# Patient Record
Sex: FEMALE | Race: WHITE | NOT HISPANIC OR LATINO | Employment: OTHER | URBAN - METROPOLITAN AREA
[De-identification: names, ages, dates, MRNs, and addresses within clinical notes are randomized per-mention and may not be internally consistent; named-entity substitution may affect disease eponyms.]

---

## 2017-05-30 ENCOUNTER — GENERIC CONVERSION - ENCOUNTER (OUTPATIENT)
Dept: OTHER | Facility: OTHER | Age: 67
End: 2017-05-30

## 2018-06-18 ENCOUNTER — ANNUAL EXAM (OUTPATIENT)
Dept: OBGYN CLINIC | Facility: CLINIC | Age: 68
End: 2018-06-18
Payer: COMMERCIAL

## 2018-06-18 VITALS
WEIGHT: 157 LBS | SYSTOLIC BLOOD PRESSURE: 118 MMHG | DIASTOLIC BLOOD PRESSURE: 80 MMHG | BODY MASS INDEX: 26.16 KG/M2 | HEIGHT: 65 IN

## 2018-06-18 DIAGNOSIS — Z12.31 VISIT FOR SCREENING MAMMOGRAM: Primary | ICD-10-CM

## 2018-06-18 DIAGNOSIS — Z01.419 WOMEN'S ANNUAL ROUTINE GYNECOLOGICAL EXAMINATION: ICD-10-CM

## 2018-06-18 PROBLEM — E55.9 VITAMIN D DEFICIENCY: Status: ACTIVE | Noted: 2017-04-24

## 2018-06-18 PROCEDURE — 99397 PER PM REEVAL EST PAT 65+ YR: CPT | Performed by: OBSTETRICS & GYNECOLOGY

## 2018-06-18 RX ORDER — ERGOCALCIFEROL (VITAMIN D2) 10 MCG
TABLET ORAL
COMMUNITY

## 2018-06-18 RX ORDER — LANOLIN ALCOHOL/MO/W.PET/CERES
CREAM (GRAM) TOPICAL
COMMUNITY

## 2018-06-18 RX ORDER — RIBOFLAVIN (VITAMIN B2) 100 MG
100 TABLET ORAL DAILY
COMMUNITY

## 2018-06-18 NOTE — PROGRESS NOTES
Assessment/Plan:  1  Yearly exam-Pap smear deferred, self-breast awareness reviewed, calcium/vitamin-D recommendations discussed, mammogram request given, colonoscopy as per specialist   2   Rectocele-unchanged from prior examinations  Patient was given information sheet about this  She denies any bowel movement issues  She will call with any issues  She was also given information on Kegel's exercises  3   Mild vaginal atrophy-patient without symptoms  No intervention recommended  4   History of osteoporosis-noted on most recent DEXA scan May 2017  She was seen by her primary doctor who suggested treatment but the patient declined  Patient will continue calcium with vitamin-D  Most recent vitamin-D level was 36  She has appointment with her primary physician in the near future  She will follow-up in 1 year or as needed  No problem-specific Assessment & Plan notes found for this encounter  Diagnoses and all orders for this visit:    Visit for screening mammogram  -     Mammo screening bilateral w cad; Future    Women's annual routine gynecological examination    Other orders  -     calcium citrate-vitamin D (CITRACAL+D) 315-200 MG-UNIT per tablet; Take by mouth  -     Multiple Vitamin (DAILY VALUE MULTIVITAMIN) TABS; Take by mouth  -     Evening Primrose Oil 1000 MG CAPS; by Does not apply route  -     Omega-3 Fatty Acids (FISH OIL) 645 MG CAPS; Take by mouth  -     Ascorbic Acid (VITAMIN C) 100 MG tablet; Take 100 mg by mouth daily          Subjective:      Patient ID: Willie Pimentel is a 74 y o  female  Patient was seen today for yearly exam   Please see assessment plan for details          The following portions of the patient's history were reviewed and updated as appropriate: allergies, current medications, past family history, past medical history, past social history, past surgical history and problem list     Review of Systems   Constitutional: Negative for chills, diaphoresis, fatigue and fever  Respiratory: Negative for apnea, cough, chest tightness, shortness of breath and wheezing  Cardiovascular: Negative for chest pain, palpitations and leg swelling  Gastrointestinal: Negative for abdominal distention, abdominal pain, anal bleeding, constipation, diarrhea, nausea, rectal pain and vomiting  Genitourinary: Negative for difficulty urinating, dyspareunia, dysuria, frequency, hematuria, menstrual problem, pelvic pain, urgency, vaginal bleeding, vaginal discharge and vaginal pain  Musculoskeletal: Negative for arthralgias, back pain and myalgias  Skin: Negative for color change and rash  Neurological: Negative for dizziness, syncope, light-headedness, numbness and headaches  Hematological: Negative for adenopathy  Does not bruise/bleed easily  Psychiatric/Behavioral: Negative for dysphoric mood and sleep disturbance  The patient is not nervous/anxious  Objective:      /80 (BP Location: Right arm, Patient Position: Sitting, Cuff Size: Standard)    5' 5" (1 651 m)   Wt 71 2 kg (157 lb)   BMI 26 13 kg/m²          Physical Exam    Objective      /80 (BP Location: Right arm, Patient Position: Sitting, Cuff Size: Standard)   Ht 5' 5" (1 651 m)   Wt 71 2 kg (157 lb)   BMI 26 13 kg/m²     General:   alert and oriented, in no acute distress, alert, appears stated age and cooperative   Neck: normal to inspection and palpation   Breast: normal appearance, no masses or tenderness   Heart:    Lungs:    Abdomen: soft, non-tender, without masses or organomegaly   Vulva: normal   Vagina: Slightly atrophic, without erythema or lesions or discharge  Cervix: Slightly atrophic, without lesions or cervicitis  No Cervical motion tenderness  and normal   Uterus: top normal size, anteverted, non-tender   Adnexa: no mass, fullness, tenderness   Rectum: negative, grade 1 rectocele

## 2018-06-18 NOTE — PATIENT INSTRUCTIONS
Rectocele   WHAT YOU NEED TO KNOW:   What is a rectocele? A rectocele, or vaginal hernia, is a bulge of the front wall of your rectum into your vagina  What causes a rectocele? A rectocele is often caused by weak muscles and ligaments that cannot hold and support the vagina and rectum  A wall of tough tissue, called the rectovaginal septum, separates the rectum from the vagina  The rectovaginal septum may be weak and thin  This weakening allows part of the rectum to push into the vagina  Weakening may be caused by the following:  · Aging:  Aging can cause the muscles to become weak  After menopause, a woman's body makes much less estrogen  Less estrogen makes pelvic muscles weaker  · Childbirth:  Pregnancy, and trauma during labor and delivery of a baby, may cause muscles around the vagina or rectum to weaken  Forceps used in the delivery, or tears into the rectum during delivery may cause the muscles to weaken  Large babies or multiple pregnancies may make your muscles weaker  · Genetics:  You may have been born with weaker muscles in your rectum and vagina  · Obesity:  More weight than what is suggested by your healthcare provider can put extra pressure on your muscles  · Straining:  The pressure inside your rectum increases when you strain  This usually happens with constipation, forceful coughing, and lifting heavy objects  · Surgery:  Past pelvic surgeries, such as a hysterectomy, may weaken the muscles around your vagina  What are the signs and symptoms of a rectocele? · A soft bulge of tissue in your vagina that may poke out through the vaginal opening     · Constipation    · Bowel movement that leaks out from your rectum     · Low back pain that goes away when you lie down     · Pain or pressure in your vagina when you urinate or have sex     · Pressure in your rectum, or you feel that your rectum is not empty after you have a bowel movement  How is a rectocele diagnosed?   Your healthcare provider will ask you about your lifestyle, past pregnancies, and any diseases you may have had  You may also need any of the following tests:  · Manual exam:  Your healthcare provider gently puts a warmed speculum into your vagina  A speculum is a tool that opens your vagina  Then he will ask you to strain or push down  This may cause a rectocele to bulge so he can check its size and location  You may need to tighten the muscles of your pelvis as if you are trying to stop urinating  This helps your healthcare provider learn the strength of your pelvic muscles  · Defecography:  A thick paste of barium is placed into your rectum through your anus  X-rays are taken while you push out the barium as if you are having a bowel movement  The barium makes an x-ray outline of your rectum and anus  This shows the changes taking place in your rectum and muscles during a bowel movement  · MRI:  This scan uses powerful magnets and a computer to take pictures of your abdomen and pelvis  The pictures may show problems in you rectum, vagina, or bladder  You may be given dye to help the pictures show up better  Tell the healthcare provider if you have ever had an allergic reaction to contrast dye  Do not enter the MRI room with anything metal  Metal can cause serious injury  Tell the healthcare provider if you have any metal in or on your body  · Pelvic floor fluoroscopy: This is an x-ray that shows the movement of your bowels, vagina, bladder, or rectum  Pictures of these body parts are taken and shown on a monitor  Healthcare providers may give you dye to help the pictures show up better on the screen  Tell the healthcare provider if you have ever had an allergic reaction to contrast dye  · Ultrasound: This test uses sound waves to show pictures on a monitor  An ultrasound may be done to show problems with your rectum, vagina, or bladder       · Anorectal manometry:  A flexible tube is inserted through your anus and into your rectum  Pressure from the muscles around your anus and rectum is measured by sensors in the tube  · Colonic transit studies: This test measures how fast things pass through your colon  You will be asked to swallow a large pill containing tiny plastic rings that serve as markers  These markers can be seen on x-rays  Pictures of your abdomen are taken for several days and the markers are counted  This helps your healthcare provider learn how long it takes for food to pass through your intestines  How is a rectocele treated? · Biofeedback therapy:  Biofeedback uses equipment to train you to control and relax your pelvic muscles during a bowel movement  Ask your healthcare provider for more information about biofeedback  · Estrogen:  Estrogen is a hormone that may be taken as a pill, or applied as a vaginal cream  Estrogen helps keep your pelvic muscles strong and may prevent your rectocele from getting worse  · Pessary:  A pessary is a plastic or rubber ring that is placed inside your vagina  This supports the bulging areas in your vagina and rectum  · Surgery:  Surgery may be needed to move the rectum back into place  This surgery fixes the weak walls of your vagina  Your rectum will move back into place once the walls of your vagina are fixed  What are the risks of a rectocele? A pessary may cause pain or infection  You may develop irregular bleeding or cancer if you use estrogen  Your rectocele may happen again, even with treatment  Your intestines may become blocked without treatment  How can I help prevent a rectocele? · Avoid pressure in your abdomen:  Do not strain when you have a bowel movement  Do not lift heavy objects  Tell your healthcare provider if you have a severe cough  · Do Kegel exercises regularly:  Squeeze your pelvic floor muscles to help them get stronger  Ask your healthcare provider for more information about Kegel exercises       · Drink liquids as directed:  Ask your healthcare provider how much liquid to drink each day and which liquids are best for you  Liquids will help decrease constipation  · Eat more fiber:  High-fiber foods, such as fresh fruits, vegetables, and whole grains, soften bowel movements  This helps bowel movements pass more quickly through your colon  Slowly add fiber into your diet to avoid bloating, stomach pain, and gas  · Maintain a healthy weight:  Ask your healthcare provider how much you should weigh  Ask him to help you create a weight loss plan if you are overweight  Ask for help planning an exercise program  Exercise helps your bowels work better and decreases pressure inside your colon  When should I contact my healthcare provider? · Your pain does not go away, even with treatment  · Your pessary falls out  · You have heavier vaginal bleeding than usual      · You are unable to have a bowel movement  · You have questions or concerns about your condition or care  When should I seek immediate care or call 911? · You have a mass hanging out of your vagina that you cannot push back into place  · You vomit several times in a row  · Your bowel movement is bright red or black  CARE AGREEMENT:   You have the right to help plan your care  Learn about your health condition and how it may be treated  Discuss treatment options with your caregivers to decide what care you want to receive  You always have the right to refuse treatment  The above information is an  only  It is not intended as medical advice for individual conditions or treatments  Talk to your doctor, nurse or pharmacist before following any medical regimen to see if it is safe and effective for you  © 2017 [employer] Information is for End User's use only and may not be sold, redistributed or otherwise used for commercial purposes   All illustrations and images included in CareNotes® are the copyrighted property of [employer]  or Dwayne Barragan

## 2019-09-24 ENCOUNTER — ANNUAL EXAM (OUTPATIENT)
Dept: OBGYN CLINIC | Facility: CLINIC | Age: 69
End: 2019-09-24
Payer: COMMERCIAL

## 2019-09-24 VITALS
SYSTOLIC BLOOD PRESSURE: 128 MMHG | WEIGHT: 160 LBS | DIASTOLIC BLOOD PRESSURE: 80 MMHG | HEIGHT: 66 IN | BODY MASS INDEX: 25.71 KG/M2

## 2019-09-24 DIAGNOSIS — Z01.419 WOMEN'S ANNUAL ROUTINE GYNECOLOGICAL EXAMINATION: ICD-10-CM

## 2019-09-24 DIAGNOSIS — N81.6 RECTOCELE: ICD-10-CM

## 2019-09-24 DIAGNOSIS — Z12.31 VISIT FOR SCREENING MAMMOGRAM: ICD-10-CM

## 2019-09-24 DIAGNOSIS — N95.2 ATROPHY OF VAGINA: Primary | ICD-10-CM

## 2019-09-24 PROCEDURE — G0101 CA SCREEN;PELVIC/BREAST EXAM: HCPCS | Performed by: OBSTETRICS & GYNECOLOGY

## 2019-09-24 RX ORDER — MELATONIN
1000 DAILY
COMMUNITY

## 2019-09-24 RX ORDER — NICOTINE POLACRILEX 2 MG
GUM BUCCAL
COMMUNITY

## 2019-09-24 NOTE — PATIENT INSTRUCTIONS
Vaginal Atrophy   WHAT YOU NEED TO KNOW:   What is vaginal atrophy? Vaginal atrophy is a condition that causes thinning, drying, and inflammation of vaginal tissue  This condition is caused by decreased levels of estrogen (a female sex hormone)  Vaginal atrophy can increase your risk for vaginal and urinary tract infections  Vaginal atrophy can worsen over time if not treated  What causes or increases your risk of vaginal atrophy? · Menopause     · Medicines that lower your estrogen levels, such as those used to treat breast cancer, endometriosis, or fibroids    · Radiation to your pelvic area     · Surgery to remove the ovaries    · Breastfeeding  What are the signs and symptoms of vaginal atrophy? · Vaginal dryness, itching, and burning    · Vaginal discharge    · Pain or discomfort during sex    · Light bleeding after sex    · Burning during urination    · Frequent, sudden, strong urges to urinate    · Urinary incontinence (loss of control of your bladder)  How is vaginal atrophy diagnosed? Your healthcare provider will ask about your symptoms  A pelvic exam will be done to examine your vagina and cervix  Your healthcare provider will place a speculum into your vagina to open and examine it  A sample of discharge from your vagina may be collected and tested  A urine test may also be done  How is vaginal atrophy treated? · Over-the counter vaginal moisturizers  can help reduce dryness  Your healthcare provider may recommend that you use a vaginal moisturizer several times each week and during sex  Only use creams that are made for vaginal use  Do  not  use petroleum jelly  Lubricants can be used during sex to decrease pain and discomfort  · Estrogen  may help decrease dryness  It may also lower your risk of vaginal infections if you are going through menopause  It can also help to relieve urinary symptoms  Estrogen may be prescribed in the form of a cream, tablet, or ring   These medicines can be applied or inserted into the vagina  Estrogen can also be prescribed in the form of a pill  When should I contact my healthcare provider? · You have a foul-smelling odor coming from your vagina  · You have a thick, cheese-like discharge from your vagina  · You have itching, swelling, or redness in your vagina  · You have pain or burning when you urinate  · Your urine smells bad  · Your symptoms do not improve, or they get worse  · You have questions or concerns about your condition or care  CARE AGREEMENT:   You have the right to help plan your care  Learn about your health condition and how it may be treated  Discuss treatment options with your caregivers to decide what care you want to receive  You always have the right to refuse treatment  The above information is an  only  It is not intended as medical advice for individual conditions or treatments  Talk to your doctor, nurse or pharmacist before following any medical regimen to see if it is safe and effective for you  © 2017 [employer] Information is for End User's use only and may not be sold, redistributed or otherwise used for commercial purposes  All illustrations and images included in CareNotes® are the copyrighted property of [employer]  or Dwayne Barragan

## 2019-09-24 NOTE — PROGRESS NOTES
Assessment/Plan   Diagnoses and all orders for this visit:    Atrophy of vagina    Rectocele    Women's annual routine gynecological examination    Other orders  -     cholecalciferol (VITAMIN D3) 1,000 units tablet; Take 1,000 Units by mouth daily  -     Biotin 1 MG CAPS; Take by mouth     1  Yearly exam-Pap smear deferred, self-breast awareness reviewed, calcium/vitamin-D recommendations discussed, mammogram request given, colonoscopy recommended  Hemoccult test given as patient has not had colonoscopy as of yet  She will check for insurance company coverage regarding this test   2  Rectocele-patient without symptoms  Similar findings to previous exam   Patient was previously given eROI's information on exercises  She will call or return with any issues  3  Mild vaginal atrophy-patient is asymptomatic  She was given the vaginal lubrication/moisturizer sheet she will call or return with issues  4  History of osteoporosis-DEXA scan from May 2017 demonstrated osteoporosis, with left hip -1 6 standard deviations below the mean and femoral neck -2 6 standard deviations  Patient was previously counseled about treatment options  She will continue to follow-up with her primary doctor regarding this issue  Most recent vitamin-D level from 2018 was 61    5  Other-patient's   May 2019  She was  54 years  My support was given  She seems to be doing quite well with good family support  She will follow-up in 1 year or as needed  Subjective   Patient ID: Anya Huang is a 79 y o  female  Vitals:    [phone]   BP: 128/80     Patient was seen today for yearly exam   Please see assessment plan for details        The following portions of the patient's history were reviewed and updated as appropriate: allergies, current medications, past family history, past medical history, past social history, past surgical history and problem list   Past Medical History:   Diagnosis Date  Urinary tract infection     Uterine leiomyoma     last assessed 2014     Past Surgical History:   Procedure Laterality Date    DILATION AND CURETTAGE OF UTERUS      ENDOMETRIAL BIOPSY      by suction    HYSTEROSCOPY      TUBAL LIGATION       OB History    Para Term  AB Living   1 1           SAB TAB Ectopic Multiple Live Births                  # Outcome Date GA Lbr [place]/2nd Weight Sex Delivery Anes PTL Lv   1 Para                Current Outpatient Medications:     Ascorbic Acid (VITAMIN C) 100 MG tablet, Take 100 mg by mouth daily, Disp: , Rfl:     Biotin 1 MG CAPS, Take by mouth, Disp: , Rfl:     calcium citrate-vitamin D (CITRACAL+D) 315-200 MG-UNIT per tablet, Take by mouth, Disp: , Rfl:     cholecalciferol (VITAMIN D3) 1,000 units tablet, Take 1,000 Units by mouth daily, Disp: , Rfl:     Evening Primrose Oil 1000 MG CAPS, by Does not apply route, Disp: , Rfl:     Multiple Vitamin (DAILY VALUE MULTIVITAMIN) TABS, Take by mouth, Disp: , Rfl:     Omega-3 Fatty Acids (FISH OIL) 645 MG CAPS, Take by mouth, Disp: , Rfl:   No Known Allergies  Social History     Socioeconomic History    Marital status: /Civil Union     Spouse name: None    Number of children: 1    Years of education: None    Highest education level: None   Occupational History    None   Social Needs    Financial resource strain: None    Food insecurity:     Worry: None     Inability: None    Transportation needs:     Medical: None     Non-medical: None   Tobacco Use    Smoking status: Never Smoker    Smokeless tobacco: Never Used   Substance and Sexual Activity    Alcohol use: No    Drug use: No    Sexual activity: Never   Lifestyle    Physical activity:     Days per week: None     Minutes per session: None    Stress: None   Relationships    Social connections:     Talks on phone: None     Gets together: None     Attends Hoahaoism service: None     Active member of club or organization: None Attends meetings of clubs or organizations: None     Relationship status: None    Intimate partner violence:     Fear of current or ex partner: None     Emotionally abused: None     Physically abused: None     Forced sexual activity: None   Other Topics Concern    None   Social History Narrative    Caffeine use    Confucianism affiliation-None    Uses safety equipment-seat belts     Family History   Problem Relation Age of Onset    Stomach cancer Mother     Hypertension Mother     Heart attack Father         acute MI    Heart disease Father     Diabetes Brother        Review of Systems   Constitutional: Negative for chills, diaphoresis, fatigue and fever  Respiratory: Negative for apnea, cough, chest tightness, shortness of breath and wheezing  Cardiovascular: Negative for chest pain, palpitations and leg swelling  Gastrointestinal: Negative for abdominal distention, abdominal pain, anal bleeding, constipation, diarrhea, nausea, rectal pain and vomiting  Genitourinary: Negative for difficulty urinating, dyspareunia, dysuria, frequency, hematuria, menstrual problem, pelvic pain, urgency, vaginal bleeding, vaginal discharge and vaginal pain  Musculoskeletal: Negative for arthralgias, back pain and myalgias  Skin: Negative for color change and rash  Neurological: Negative for dizziness, syncope, light-headedness, numbness and headaches  Hematological: Negative for adenopathy  Does not bruise/bleed easily  Psychiatric/Behavioral: Negative for dysphoric mood and sleep disturbance  The patient is not nervous/anxious          Objective   Physical Exam    Objective      /80 (BP Location: Left arm, Patient Position: Sitting, Cuff Size: Large)   Ht 5' 5 5" (1 664 m)   Wt 72 6 kg (160 lb)   BMI 26 22 kg/m²     General:   alert and oriented, in no acute distress   Neck: normal to inspection and palpation   Breast: normal appearance, no masses or tenderness   Heart:    Lungs:    Abdomen: soft, non-tender, without masses or organomegaly   Vulva: normal   Vagina: Mildly atrophic, without erythema or lesions or discharge  Cervix: Mildly atrophic, without lesions or discharge or cervicitis  No Cervical motion tenderness     Uterus: top normal size, anteverted, non-tender   Adnexa: no mass, fullness, tenderness   Rectum: Negative, grade 1-2 rectocele    Psych:  Normal mood and affect   Skin:  Without obvious lesions   Eyes: symmetric, with normal movements and reactivity   Musculoskeletal:  Normal muscle tone and movements appreciated

## 2021-09-13 ENCOUNTER — ANNUAL EXAM (OUTPATIENT)
Dept: OBGYN CLINIC | Facility: CLINIC | Age: 71
End: 2021-09-13
Payer: COMMERCIAL

## 2021-09-13 VITALS
SYSTOLIC BLOOD PRESSURE: 116 MMHG | HEIGHT: 63 IN | WEIGHT: 160 LBS | DIASTOLIC BLOOD PRESSURE: 78 MMHG | BODY MASS INDEX: 28.35 KG/M2

## 2021-09-13 DIAGNOSIS — N81.6 RECTOCELE: ICD-10-CM

## 2021-09-13 DIAGNOSIS — Z01.419 WOMEN'S ANNUAL ROUTINE GYNECOLOGICAL EXAMINATION: Primary | ICD-10-CM

## 2021-09-13 DIAGNOSIS — N95.2 ATROPHY OF VAGINA: ICD-10-CM

## 2021-09-13 DIAGNOSIS — Z12.4 CERVICAL CANCER SCREENING: ICD-10-CM

## 2021-09-13 DIAGNOSIS — Z12.31 VISIT FOR SCREENING MAMMOGRAM: ICD-10-CM

## 2021-09-13 PROCEDURE — G0145 SCR C/V CYTO,THINLAYER,RESCR: HCPCS | Performed by: OBSTETRICS & GYNECOLOGY

## 2021-09-13 PROCEDURE — G0101 CA SCREEN;PELVIC/BREAST EXAM: HCPCS | Performed by: OBSTETRICS & GYNECOLOGY

## 2021-09-13 RX ORDER — LYSINE 500 MG
500 TABLET ORAL DAILY
COMMUNITY

## 2021-09-13 RX ORDER — SELENIUM 50 MCG
1 TABLET ORAL DAILY
COMMUNITY

## 2021-09-13 NOTE — PATIENT INSTRUCTIONS
Vaginal Atrophy   WHAT YOU NEED TO KNOW:   What is vaginal atrophy? Vaginal atrophy is a condition that causes thinning, drying, and inflammation of vaginal tissue  This condition is caused by decreased levels of estrogen (a female sex hormone)  Vaginal atrophy can increase your risk for vaginal and urinary tract infections  Vaginal atrophy can worsen over time if not treated  What causes or increases your risk of vaginal atrophy? · Menopause     · Medicines that lower your estrogen levels, such as those used to treat breast cancer, endometriosis, or fibroids    · Radiation to your pelvic area     · Surgery to remove the ovaries    · Breastfeeding    What are the signs and symptoms of vaginal atrophy? · Vaginal dryness, itching, and burning    · Vaginal discharge    · Pain or discomfort during sex    · Light bleeding after sex    · Burning during urination    · Frequent, sudden, strong urges to urinate    · Urinary incontinence (loss of control of your bladder)    How is vaginal atrophy diagnosed? Your healthcare provider will ask about your symptoms  A pelvic exam will be done to examine your vagina and cervix  Your healthcare provider will place a speculum into your vagina to open and examine it  A sample of discharge from your vagina may be collected and tested  A urine test may also be done  How is vaginal atrophy treated? · Over-the counter vaginal moisturizers  can help reduce dryness  Your healthcare provider may recommend that you use a vaginal moisturizer several times each week and during sex  Only use creams that are made for vaginal use  Do  not  use petroleum jelly  Lubricants can be used during sex to decrease pain and discomfort  · Estrogen  may help decrease dryness  It may also lower your risk of vaginal infections if you are going through menopause  It can also help to relieve urinary symptoms  Estrogen may be prescribed in the form of a cream, tablet, or ring   These medicines can be applied or inserted into the vagina  Estrogen can also be prescribed in the form of a pill  When should I contact my healthcare provider? · You have a foul-smelling odor coming from your vagina  · You have a thick, cheese-like discharge from your vagina  · You have itching, swelling, or redness in your vagina  · You have pain or burning when you urinate  · Your urine smells bad  · Your symptoms do not improve, or they get worse  · You have questions or concerns about your condition or care  CARE AGREEMENT:   You have the right to help plan your care  Learn about your health condition and how it may be treated  Discuss treatment options with your healthcare providers to decide what care you want to receive  You always have the right to refuse treatment  The above information is an  only  It is not intended as medical advice for individual conditions or treatments  Talk to your doctor, nurse or pharmacist before following any medical regimen to see if it is safe and effective for you  © Copyright [employer] 2021 Information is for End User's use only and may not be sold, redistributed or otherwise used for commercial purposes  All illustrations and images included in CareNotes® are the copyrighted property of A D A M , Inc  or [address] [place] [address]  Osteoporosis   WHAT YOU NEED TO KNOW:   What is osteoporosis? Osteoporosis is a long-term medical condition that causes your bones to become weak, brittle, and more likely to fracture  Osteoporosis occurs when your body absorbs more bone than it makes  It is also caused by a lack of calcium and estrogen (female hormone)  What increases my risk for osteoporosis?    · Age older than 28    · Low estrogen levels    · Female gender    · Alcohol, tobacco, or caffeine    · Lack of calcium and vitamin D in your foods    · Lack of exercise    · Some illnesses, such as thyroid diseases, bone cancer, and long-term lung diseases    · Certain medicines, such as steroids, anticonvulsants, and blood-thinners    What are the signs and symptoms of osteoporosis? You may not have any signs or symptoms  You may break a bone after a muscle strain, bump, or fall  A break usually occurs in the hip, spine, or wrist  A collapsed vertebra (bone in your spine) may cause severe back pain or loss of height from bent posture  How is osteoporosis diagnosed? · Blood and urine tests  measure your calcium, vitamin D, and estrogen levels  · An x-ray or CT  may show thinned bones or a fracture  You may be given contrast liquid to help the bones show up better in the pictures  Tell the healthcare provider if you have ever had an allergic reaction to contrast liquid  Do not enter the MRI room with anything metal  Metal can cause serious injury  Tell the healthcare provider if you have any metal in or on your body  · A bone density test  compares your bone thickness with what is expected for someone of your age, gender, and ethnicity  How is osteoporosis treated? Medicines may be given to prevent bone loss, build new bone, and increase estrogen  These medicines help prevent fractures and may be given as a pill or injection  Ask your healthcare provider for more information on these medicines  How can I help prevent bone loss? · Eat healthy foods that are high in calcium  This helps keep your bones strong  Good sources of calcium are milk, cheese, broccoli, tofu, almonds, and canned salmon and sardines  · Increase your vitamin D intake  Vitamin D is in fish oils, some vegetables, and fortified milk, cereal, and bread  Vitamin D is also formed in the skin when it is exposed to the sun  Ask your healthcare provider how much sunlight is safe for you  · Drink liquids as directed  Ask your healthcare provider how much liquid to drink each day and which liquids are best for you  Do not have alcohol or caffeine   They decrease bone mineral density, which can weaken your bones  · Exercise regularly  Ask your healthcare provider about the best exercise plan for you  Weight bearing exercise for 30 minutes, 3 times a week can help build and strengthen bone  · Do not smoke  Nicotine and other chemicals in cigarettes and cigars can cause lung damage  Ask your healthcare provider for information if you currently smoke and need help to quit  E-cigarettes or smokeless tobacco still contain nicotine  Talk to your healthcare provider before you use these products  · Go to physical therapy as directed  A physical therapist teaches you exercises to help improve movement and muscle strength  When should I call my doctor? · You have severe pain  · You have increasing pain after a fall  · You have pain when you do your daily activities  · You have questions or concerns about your condition or care  CARE AGREEMENT:   You have the right to help plan your care  Learn about your health condition and how it may be treated  Discuss treatment options with your healthcare providers to decide what care you want to receive  You always have the right to refuse treatment  The above information is an  only  It is not intended as medical advice for individual conditions or treatments  Talk to your doctor, nurse or pharmacist before following any medical regimen to see if it is safe and effective for you  © Copyright [employer] 2021 Information is for End User's use only and may not be sold, redistributed or otherwise used for commercial purposes   All illustrations and images included in CareNotes® are the copyrighted property of A D A M , Inc  or [address] [place] [address] [facility]

## 2021-09-13 NOTE — PROGRESS NOTES
Assessment/Plan   Diagnoses and all orders for this visit:    Women's annual routine gynecological examination    Visit for screening mammogram  -     Mammo screening bilateral w 3d & cad; Future    Rectocele    Atrophy of vagina    Other orders  -     Calcium Carbonate-Vitamin D (Oyster Shell Calcium/D) 250-125 MG-UNIT TABS; Take 1 tablet by mouth daily (Patient not taking: Reported on 2021)  -     L-Lysine 500 MG TABS; Take 500 mg by mouth daily  -     Multiple Vitamins-Minerals (ZINC PO); Take by mouth    1  Yearly exam-Pap smear done with reflex HPV, self-breast awareness reviewed, calcium/vitamin-D recommendations discussed, mammogram request given, colonoscopy as per specialist   2  Grade 1 rectocele-minimal changes noted on exam   No change noted from previous visit  Patient denies any bowel movement concerns  Counseled about Kegel's exercises, sheets given  Recommend avoidance of heavy lifting or straining with bowel movements  To call or return with any issues  3  Mild-to-moderate vaginal atrophy-patient is without symptoms  She was given vaginal lubrication/moisturizer sheet she will use accordingly  4  History osteoporosis-DEXA scan most recently from 19 with lumbar spine T-score-2 7, decreased 1 8% from 2017 study  Femoral neck T-score-2 2, increased 9 2% from previous study  Patient declines medication at this time  She did have virtual visit with endocrinology and had blood work done  A protein score was elevated which gave her much anxiety  She saw Hematology, who stated that she may have a very small risk of multiple myeloma in the 30 year future  At this time, continues calcium, vitamin-D, and weight-bearing exercise  To discuss DEXA scan with her primary doctor  5  Other-  May 2019  Support was previously given  Follow-up 1-2 years for yearly exam as needed  Subjective   Patient ID: Lily Powers is a 78 y o  female      Vitals:    21 1110   BP: 116/78     Patient was seen today for yearly exam   Please see assessment plan for details        The following portions of the patient's history were reviewed and updated as appropriate: allergies, current medications, past family history, past medical history, past social history, past surgical history and problem list   Past Medical History:   Diagnosis Date    Urinary tract infection     Uterine leiomyoma     last assessed 2014     Past Surgical History:   Procedure Laterality Date    DILATION AND CURETTAGE OF UTERUS      ENDOMETRIAL BIOPSY      by suction    HYSTEROSCOPY      TUBAL LIGATION       OB History    Para Term  AB Living   1 1           SAB TAB Ectopic Multiple Live Births                  # Outcome Date GA Lbr [place]/2nd Weight Sex Delivery Anes PTL Lv   1 Para                Current Outpatient Medications:     Ascorbic Acid (VITAMIN C) 100 MG tablet, Take 100 mg by mouth daily, Disp: , Rfl:     Biotin 1 MG CAPS, Take by mouth, Disp: , Rfl:     calcium citrate-vitamin D (CITRACAL+D) 315-200 MG-UNIT per tablet, Take by mouth, Disp: , Rfl:     cholecalciferol (VITAMIN D3) 1,000 units tablet, Take 1,000 Units by mouth daily, Disp: , Rfl:     Evening Primrose Oil 1000 MG CAPS, by Does not apply route, Disp: , Rfl:     L-Lysine 500 MG TABS, Take 500 mg by mouth daily, Disp: , Rfl:     Multiple Vitamin (DAILY VALUE MULTIVITAMIN) TABS, Take by mouth, Disp: , Rfl:     Multiple Vitamins-Minerals (ZINC PO), Take by mouth, Disp: , Rfl:     Omega-3 Fatty Acids (FISH OIL) 645 MG CAPS, Take by mouth, Disp: , Rfl:     Calcium Carbonate-Vitamin D (Oyster Shell Calcium/D) 250-125 MG-UNIT TABS, Take 1 tablet by mouth daily (Patient not taking: Reported on 2021), Disp: , Rfl:   No Known Allergies  Social History     Socioeconomic History    Marital status: /Civil Union     Spouse name: None    Number of children: 1    Years of education: None    Highest education level: None   Occupational History    None   Tobacco Use    Smoking status: Never Smoker    Smokeless tobacco: Never Used   Vaping Use    Vaping Use: Never used   Substance and Sexual Activity    Alcohol use: No    Drug use: No    Sexual activity: Not Currently   Other Topics Concern    None   Social History Narrative    Caffeine use    Mandaeism affiliation-None    Uses safety equipment-seat belts     Social Determinants of Health     Financial Resource Strain:     Difficulty of Paying Living Expenses:    Food Insecurity:     Worried About Running Out of Food in the Last Year:     Ran Out of Food in the Last Year:    Transportation Needs:     Lack of Transportation (Medical):  Lack of Transportation (Non-Medical):    Physical Activity:     Days of Exercise per Week:     Minutes of Exercise per Session:    Stress:     Feeling of Stress :    Social Connections:     Frequency of Communication with Friends and Family:     Frequency of Social Gatherings with Friends and Family:     Attends Mandaeism Services:     Active Member of Clubs or Organizations:     Attends Club or Organization Meetings:     Marital Status:    Intimate Partner Violence:     Fear of Current or Ex-Partner:     Emotionally Abused:     Physically Abused:     Sexually Abused:      Family History   Problem Relation Age of Onset    Stomach cancer Mother     Hypertension Mother     Heart attack Father         acute MI    Heart disease Father     Diabetes Brother        Review of Systems   Constitutional: Negative for chills, diaphoresis, fatigue and fever  Respiratory: Negative for apnea, cough, chest tightness, shortness of breath and wheezing  Cardiovascular: Negative for chest pain, palpitations and leg swelling  Gastrointestinal: Negative for abdominal distention, abdominal pain, anal bleeding, constipation, diarrhea, nausea, rectal pain and vomiting     Genitourinary: Negative for difficulty urinating, dyspareunia, dysuria, frequency, hematuria, menstrual problem, pelvic pain, urgency, vaginal bleeding, vaginal discharge and vaginal pain  Musculoskeletal: Negative for arthralgias, back pain and myalgias  Skin: Negative for color change and rash  Neurological: Negative for dizziness, syncope, light-headedness, numbness and headaches  Hematological: Negative for adenopathy  Does not bruise/bleed easily  Psychiatric/Behavioral: Negative for dysphoric mood and sleep disturbance  The patient is not nervous/anxious  Objective   Physical Exam  OBGyn Exam     Objective      /78 (BP Location: Left arm, Patient Position: Sitting, Cuff Size: Adult)   Ht 5' 3" (1 6 m)   Wt 72 6 kg (160 lb)   BMI 28 34 kg/m²     General:   alert and oriented, in no acute distress   Neck: normal to inspection and palpation   Breast: normal appearance, no masses or tenderness   Heart:    Lungs:    Abdomen: soft, non-tender, without masses or organomegaly   Vulva: normal   Vagina: Mild to moderate atrophy, without erythema or lesions or discharge  Cervix: Mild to moderate atrophy, without lesions or discharge or cervicitis    No Cervical motion tenderness   Uterus: top normal size, anteverted, non-tender   Adnexa: no mass, fullness, tenderness   Rectum: negative, rectocele grade 1    Psych:  Normal mood and affect   Skin:  Without obvious lesions   Eyes: symmetric, with normal movements and reactivity   Musculoskeletal:  Normal muscle tone and movements appreciated

## 2021-09-17 LAB
LAB AP GYN PRIMARY INTERPRETATION: NORMAL
Lab: NORMAL

## 2022-05-09 ENCOUNTER — APPOINTMENT (OUTPATIENT)
Dept: RADIOLOGY | Facility: CLINIC | Age: 72
End: 2022-05-09
Payer: COMMERCIAL

## 2022-05-09 ENCOUNTER — OFFICE VISIT (OUTPATIENT)
Dept: URGENT CARE | Facility: CLINIC | Age: 72
End: 2022-05-09
Payer: COMMERCIAL

## 2022-05-09 VITALS — HEART RATE: 83 BPM | TEMPERATURE: 97.2 F | SYSTOLIC BLOOD PRESSURE: 112 MMHG | DIASTOLIC BLOOD PRESSURE: 62 MMHG

## 2022-05-09 DIAGNOSIS — S82.839A DISPLACED FRACTURE OF DISTAL END OF FIBULA: ICD-10-CM

## 2022-05-09 DIAGNOSIS — S99.911A INJURY OF RIGHT ANKLE, INITIAL ENCOUNTER: ICD-10-CM

## 2022-05-09 DIAGNOSIS — S99.911A INJURY OF RIGHT ANKLE, INITIAL ENCOUNTER: Primary | ICD-10-CM

## 2022-05-09 PROCEDURE — S9083 URGENT CARE CENTER GLOBAL: HCPCS | Performed by: NURSE PRACTITIONER

## 2022-05-09 PROCEDURE — 73610 X-RAY EXAM OF ANKLE: CPT

## 2022-05-09 PROCEDURE — 99213 OFFICE O/P EST LOW 20 MIN: CPT | Performed by: NURSE PRACTITIONER

## 2022-05-09 NOTE — PATIENT INSTRUCTIONS
Ankle Fracture   AMBULATORY CARE:   An ankle fracture  is a break in 1 or more of the bones in your ankle  Common signs and symptoms include the following:   · Sudden, severe pain    · Bruising on your ankle    · Tenderness, redness, and swelling in your ankle    · Trouble moving or putting weight on your ankle or foot    · Weakness or numbness in your ankle    · Deformed ankle shape    Call your local emergency number (911 in the [address]) for any of the following:   · You feel lightheaded, short of breath, and have chest pain  · You cough up blood  Seek care immediately if:   · Your leg feels warm, tender, and painful  It may look swollen and red  · Blood soaks through your bandage  · You have severe pain in your ankle  · Your cast feels too tight  · Your foot or toes are cold or numb  · Your foot or toenails turn blue or gray  Call your doctor if:   · Your splint feels too tight  · Your swelling has increased or returned  · You have a fever  · Your pain does not go away, even after treatment  · You have questions or concerns about your condition or care  Treatment for an ankle fracture:   · Acetaminophen  decreases pain and fever  It is available without a doctor's order  Ask how much to take and how often to take it  Follow directions  Read the labels of all other medicines you are using to see if they also contain acetaminophen, or ask your doctor or pharmacist  Acetaminophen can cause liver damage if not taken correctly  Do not use more than 4 grams (4,000 milligrams) total of acetaminophen in one day  · NSAIDs , such as ibuprofen, help decrease swelling, pain, and fever  This medicine is available with or without a doctor's order  NSAIDs can cause stomach bleeding or kidney problems in certain people  If you take blood thinner medicine, always ask your healthcare provider if NSAIDs are safe for you   Always read the medicine label and follow directions  · Prescription pain medicine  may be given  Ask your healthcare provider how to take this medicine safely  Some prescription pain medicines contain acetaminophen  Do not take other medicines that contain acetaminophen without talking to your healthcare provider  Too much acetaminophen may cause liver damage  Prescription pain medicine may cause constipation  Ask your healthcare provider how to prevent or treat constipation  · Closed reduction  may be done to put your bones back into their correct position without surgery  · Open reduction surgery  is done when a closed reduction does not work or you have ligament damage  An incision is made and the bones and ligaments are put back in the correct position  This may include the use of special wires, pins, plates or screws  Self-care:       · Rest  your ankle so that it can heal  Return to normal activities as directed  · Apply ice  on your ankle for 15 to 20 minutes every hour or as directed  Use an ice pack, or put crushed ice in a plastic bag  Cover it with a towel  Ice helps prevent tissue damage and decreases swelling and pain  · Use a support device,  such as a brace, cast, or splint to limit your movement and protect your ankle  You may need to use crutches to protect your ankle and decrease your pain as you move around  Do not remove your device and do not put weight on your injured ankle  · Elevate  your ankle above the level of your heart as often as you can  This will help decrease swelling and pain  Prop your ankle on pillows or blankets to keep it elevated comfortably  Splint and cast care:  Cover the splint or cast before you bathe so it does not get wet  Tape 2 plastic trash bags to your skin above the cast  Try to keep your ankle out of the water as much as possible  Follow up with your doctor in 1 to 2 days, or as directed:   Your fracture may need to be reduced (bones pushed back into place) or you may need surgery  Write down your questions so you remember to ask them during your visits  © Copyright [employer] 2022 Information is for End User's use only and may not be sold, redistributed or otherwise used for commercial purposes  All illustrations and images included in CareNotes® are the copyrighted property of [employer]  or Yulia Prado  The above information is an  only  It is not intended as medical advice for individual conditions or treatments  Talk to your doctor, nurse or pharmacist before following any medical regimen to see if it is safe and effective for you

## 2022-05-09 NOTE — PROGRESS NOTES
[facility]        NAME: Emelia Fowler is a 73 y o  female  : 1950    MRN: [id number]  DATE: May 9, 2022  TIME: 5:53 PM    Assessment and Plan   Injury of right ankle, initial encounter [S99 911A]  1  Injury of right ankle, initial encounter  XR ankle 3+ vw right    Ambulatory Referral to Orthopedic Surgery   2  Displaced fracture of distal end of fibula  Ambulatory Referral to Orthopedic Surgery     Xray done in office   Avulsion fracture of distal fibula -   Walking boot applied  Referral to ortho   F/u as directed  Pt in agreement with plan    Patient Instructions     Follow up with PCP in 3-5 days  Proceed to  ER if symptoms worsen  Chief Complaint     Chief Complaint   Patient presents with    Ankle Injury     Patient states that she overturned her right ankle saturday a week ago  History of Present Illness   Emelia Fowler presents to the clinic c/o    Pt states was walking and overturned ankle while wearing sketchers  Pain immediately   Has been icing and applying rubs -   Has been helping   Pain worse with weight bearing  Review of Systems   Review of Systems   All other systems reviewed and are negative          Current Medications     Long-Term Medications   Medication Sig Dispense Refill    Ascorbic Acid (VITAMIN C) 100 MG tablet Take 100 mg by mouth daily      Biotin 1 MG CAPS Take by mouth      calcium citrate-vitamin D (CITRACAL+D) 315-200 MG-UNIT per tablet Take by mouth      cholecalciferol (VITAMIN D3) 1,000 units tablet Take 1,000 Units by mouth daily      Evening Primrose Oil 1000 MG CAPS by Does not apply route      L-Lysine 500 MG TABS Take 500 mg by mouth daily      Multiple Vitamin (DAILY VALUE MULTIVITAMIN) TABS Take by mouth      Omega-3 Fatty Acids (FISH OIL) 645 MG CAPS Take by mouth      Calcium Carbonate-Vitamin D (Oyster Shell Calcium/D) 250-125 MG-UNIT TABS Take 1 tablet by mouth daily (Patient not taking: Reported on 2021) Current Allergies     Allergies as of 05/09/2022    (No Known Allergies)            The following portions of the patient's history were reviewed and updated as appropriate: allergies, current medications, past family history, past medical history, past social history, past surgical history and problem list     Objective   /62   Pulse 83   Temp (!) 97 2 °F (36 2 °C) (Tympanic)          Physical Exam     Physical Exam  Vitals and nursing note reviewed  Constitutional:       Appearance: Normal appearance  She is well-developed  HENT:      Head: Normocephalic and atraumatic  Eyes:      General: Lids are normal       Conjunctiva/sclera: Conjunctivae normal    Cardiovascular:      Rate and Rhythm: Normal rate and regular rhythm  Heart sounds: Normal heart sounds, S1 normal and S2 normal    Pulmonary:      Effort: Pulmonary effort is normal       Breath sounds: Normal breath sounds  Musculoskeletal:      Right ankle: Swelling present  No deformity, ecchymosis or lacerations  Tenderness present over the lateral malleolus  No medial malleolus, ATF ligament, AITF ligament, CF ligament, posterior TF ligament, base of 5th metatarsal or proximal fibula tenderness  Decreased range of motion  Anterior drawer test negative  Normal pulse  Right Achilles Tendon: Normal    Skin:     General: Skin is warm and dry  Neurological:      Mental Status: She is alert and oriented to person, place, and time  Psychiatric:         Speech: Speech normal          Behavior: Behavior normal  Behavior is cooperative  Thought Content:  Thought content normal          Judgment: Judgment normal              Orthopedic injury treatment    Date/Time: 5/9/2022 5:56 PM  Performed by: TOMEKA Paiz  Authorized by: TOMEKA Paiz     Patient Location:  Clinic  Other Assisting Provider: No    Verbal consent obtained?: Yes    Consent given by:  Patient  Patient states understanding of procedure being performed: Yes    Patient's understanding of procedure matches consent: Yes    Procedure consent matches procedure scheduled: Yes    Relevant documents present and verified: Yes    Test results available and properly labeled: Yes    Site marked: Yes    Radiology Images displayed and confirmed  If images not available, report reviewed: Yes    Required items: Required blood products, implants, devices and special equipment available    Patient identity confirmed:  Verbally with patient  Time out: Immediately prior to the procedure a time out was called    Injury location:  Ankle  Location details:  Right ankle  Injury type:  Fracture  Fracture type: lateral malleolus    Fracture type: lateral malleolus    Neurovascular status: Neurovascularly intact    Distal perfusion: normal    Neurological function: normal    Range of motion: normal    Local anesthesia used?: No    Manipulation performed?: No    Splint type: short leg walking boot    Neurovascular status: Neurovascularly intact    Distal perfusion: normal    Neurological function: normal    Range of motion: normal    Patient tolerance:  Patient tolerated the procedure well with no immediate complications

## 2023-07-13 DIAGNOSIS — Z12.31 VISIT FOR SCREENING MAMMOGRAM: ICD-10-CM

## 2023-09-15 ENCOUNTER — ANNUAL EXAM (OUTPATIENT)
Dept: GYNECOLOGY | Facility: CLINIC | Age: 73
End: 2023-09-15
Payer: COMMERCIAL

## 2023-09-15 VITALS
BODY MASS INDEX: 28.88 KG/M2 | DIASTOLIC BLOOD PRESSURE: 78 MMHG | SYSTOLIC BLOOD PRESSURE: 124 MMHG | HEIGHT: 63 IN | WEIGHT: 163 LBS

## 2023-09-15 DIAGNOSIS — Z01.419 WOMEN'S ANNUAL ROUTINE GYNECOLOGICAL EXAMINATION: Primary | ICD-10-CM

## 2023-09-15 DIAGNOSIS — Z12.31 ENCOUNTER FOR SCREENING MAMMOGRAM FOR BREAST CANCER: ICD-10-CM

## 2023-09-15 DIAGNOSIS — M81.0 AGE-RELATED OSTEOPOROSIS WITHOUT CURRENT PATHOLOGICAL FRACTURE: ICD-10-CM

## 2023-09-15 DIAGNOSIS — N81.6 RECTOCELE: ICD-10-CM

## 2023-09-15 DIAGNOSIS — N95.2 ATROPHY OF VAGINA: ICD-10-CM

## 2023-09-15 PROCEDURE — G0101 CA SCREEN;PELVIC/BREAST EXAM: HCPCS | Performed by: OBSTETRICS & GYNECOLOGY

## 2023-09-15 PROCEDURE — G0145 SCR C/V CYTO,THINLAYER,RESCR: HCPCS | Performed by: OBSTETRICS & GYNECOLOGY

## 2023-09-15 NOTE — PROGRESS NOTES
Assessment/Plan   Diagnoses and all orders for this visit:    Women's annual routine gynecological examination    Encounter for screening mammogram for breast cancer  -     Mammo screening bilateral w 3d & cad; Future    Atrophy of vagina    Rectocele    Age-related osteoporosis without current pathological fracture    1. yearly exam-Pap smear done with reflex HPV at patient request, self breast awareness reviewed, calcium/vitamin D recommendations discussed, mammogram request given, colonoscopy recommended. Had Cologuard 2023, follow-up 3 years time. 2.  Grade 1 rectocele- stable findings noted on exam.  Patient denies any complaints. Reviewed possible symptoms related to rectocele, to call with any bowel movement issues. 3.  Mild to moderate atrophy- denies any symptoms. Vaginal lubrication/moisturizer sheet given, to use accordingly. 4.  History of osteoporosis- most recent DEXA scan 2022, lumbar spine -2.5 increased to 2% with femoral neck T score -2.7, decreased 9% from previous study . Patient is taking boron and strontium along with calcium and vitamin D. Again reviewed treatment options, she declines at this time. She plans to get repeat DEXA scan in  and then proceed accordingly. 5. other-  May 2019. Middle granddaughter is pregnant with her second great grandchild. My congratulations given. Follow-up 2 years for yearly exam or as needed. Subjective   Patient ID: Funmilayo Martinez is a 81 y.o. female. Vitals:    09/15/23 0858   BP: 124/78     Patient was seen today for yearly exam.  Please see assessment plan for details.       The following portions of the patient's history were reviewed and updated as appropriate: allergies, current medications, past family history, past medical history, past social history, past surgical history and problem list.  Past Medical History:   Diagnosis Date   • Urinary tract infection    • Uterine leiomyoma     last assessed 2014     Past Surgical History:   Procedure Laterality Date   • DILATION AND CURETTAGE OF UTERUS     • ENDOMETRIAL BIOPSY      by suction   • HYSTEROSCOPY     • TUBAL LIGATION       OB History    Para Term  AB Living   1 1           SAB IAB Ectopic Multiple Live Births                  # Outcome Date GA Lbr [place]/2nd Weight Sex Delivery Anes PTL Lv   1 Para                Current Outpatient Medications:   •  Ascorbic Acid (VITAMIN C) 100 MG tablet, Take 100 mg by mouth daily, Disp: , Rfl:   •  Biotin 1 MG CAPS, Take by mouth, Disp: , Rfl:   •  calcium citrate-vitamin D (CITRACAL+D) 315-200 MG-UNIT per tablet, Take by mouth, Disp: , Rfl:   •  cholecalciferol (VITAMIN D3) 1,000 units tablet, Take 1,000 Units by mouth daily, Disp: , Rfl:   •  Evening Primrose Oil 1000 MG CAPS, by Does not apply route, Disp: , Rfl:   •  Multiple Vitamin (DAILY VALUE MULTIVITAMIN) TABS, Take by mouth, Disp: , Rfl:   •  Omega-3 Fatty Acids (FISH OIL) 645 MG CAPS, Take by mouth, Disp: , Rfl:   •  Calcium Carbonate-Vitamin D (Oyster Shell Calcium/D) 250-125 MG-UNIT TABS, Take 1 tablet by mouth daily (Patient not taking: Reported on 2021), Disp: , Rfl:   •  L-Lysine 500 MG TABS, Take 500 mg by mouth daily (Patient not taking: Reported on 9/15/2023), Disp: , Rfl:   •  Multiple Vitamins-Minerals (ZINC PO), Take by mouth (Patient not taking: Reported on 9/15/2023), Disp: , Rfl:   No Known Allergies  Social History     Socioeconomic History   • Marital status: /Civil Union     Spouse name: None   • Number of children: 1   • Years of education: None   • Highest education level: None   Occupational History   • None   Tobacco Use   • Smoking status: Never   • Smokeless tobacco: Never   Vaping Use   • Vaping Use: Never used   Substance and Sexual Activity   • Alcohol use: No   • Drug use: No   • Sexual activity: Not Currently   Other Topics Concern   • None   Social History Narrative    Caffeine use    Muslim affiliation-None    Uses safety equipment-seat belts     Social Determinants of Health     Financial Resource Strain: Not on file   Food Insecurity: Not on file   Transportation Needs: Not on file   Physical Activity: Not on file   Stress: Not on file   Social Connections: Not on file   Intimate Partner Violence: Not on file   Housing Stability: Not on file     Family History   Problem Relation Age of Onset   • Stomach cancer Mother    • Hypertension Mother    • Heart attack Father         acute MI   • Heart disease Father    • Diabetes Brother        Review of Systems   Constitutional: Negative for chills, diaphoresis, fatigue and fever. Respiratory: Negative for apnea, cough, chest tightness, shortness of breath and wheezing. Cardiovascular: Negative for chest pain, palpitations and leg swelling. Gastrointestinal: Negative for abdominal distention, abdominal pain, anal bleeding, constipation, diarrhea, nausea, rectal pain and vomiting. Genitourinary: Negative for difficulty urinating, dyspareunia, dysuria, frequency, hematuria, menstrual problem, pelvic pain, urgency, vaginal bleeding, vaginal discharge and vaginal pain. Musculoskeletal: Negative for arthralgias, back pain and myalgias. Skin: Negative for color change and rash. Neurological: Negative for dizziness, syncope, light-headedness, numbness and headaches. Hematological: Negative for adenopathy. Does not bruise/bleed easily. Psychiatric/Behavioral: Negative for dysphoric mood and sleep disturbance. The patient is not nervous/anxious.         Objective   Physical Exam  OBGyn Exam     Objective      /78 (BP Location: Right arm, Patient Position: Sitting)   Ht 5' 3.25" (1.607 m)   Wt 73.9 kg (163 lb)   BMI 28.65 kg/m²     General:   alert and oriented, in no acute distress   Neck: normal to inspection and palpation   Breast: normal appearance, no masses or tenderness   Heart:    Lungs:    Abdomen: soft, non-tender, without masses or organomegaly   Vulva: normal   Vagina:  Mildly atrophic, without erythema or lesions or discharge. Cervix:  Mildly atrophic, without lesions or discharge or cervicitis. No CMT.    Uterus: top normal size, anteverted, non-tender   Adnexa: no mass, fullness, tenderness   Rectum:  Mildly atrophic, grade 1 rectocele noted    Psych:  Normal mood and affect   Skin:  Without obvious lesions   Eyes: symmetric, with normal movements and reactivity   Musculoskeletal:  Normal muscle tone and movements appreciated

## 2023-09-20 LAB
LAB AP GYN PRIMARY INTERPRETATION: NORMAL
Lab: NORMAL

## 2024-06-10 ENCOUNTER — APPOINTMENT (OUTPATIENT)
Dept: RADIOLOGY | Facility: CLINIC | Age: 74
End: 2024-06-10
Payer: COMMERCIAL

## 2024-06-10 ENCOUNTER — OFFICE VISIT (OUTPATIENT)
Dept: URGENT CARE | Facility: CLINIC | Age: 74
End: 2024-06-10
Payer: COMMERCIAL

## 2024-06-10 VITALS
RESPIRATION RATE: 18 BRPM | HEART RATE: 76 BPM | TEMPERATURE: 96.7 F | SYSTOLIC BLOOD PRESSURE: 128 MMHG | OXYGEN SATURATION: 98 % | DIASTOLIC BLOOD PRESSURE: 76 MMHG | BODY MASS INDEX: 28.47 KG/M2 | WEIGHT: 162 LBS

## 2024-06-10 DIAGNOSIS — S69.91XA INJURY OF RIGHT WRIST, INITIAL ENCOUNTER: Primary | ICD-10-CM

## 2024-06-10 DIAGNOSIS — S69.91XA INJURY OF RIGHT WRIST, INITIAL ENCOUNTER: ICD-10-CM

## 2024-06-10 DIAGNOSIS — S62.101A CLOSED FRACTURE OF RIGHT WRIST, INITIAL ENCOUNTER: ICD-10-CM

## 2024-06-10 PROCEDURE — 73110 X-RAY EXAM OF WRIST: CPT

## 2024-06-10 PROCEDURE — S9083 URGENT CARE CENTER GLOBAL: HCPCS

## 2024-06-10 PROCEDURE — 99214 OFFICE O/P EST MOD 30 MIN: CPT

## 2024-06-10 PROCEDURE — 29125 APPL SHORT ARM SPLINT STATIC: CPT | Performed by: NURSE PRACTITIONER

## 2024-06-10 NOTE — PROGRESS NOTES
[facility]        NAME: Anuradha Lubin is a 73 y.o. female  : 1950    MRN: [id number]  DATE: Kimberley 10, 2024  TIME: 1:05 PM    Assessment and Plan   Injury of right wrist, initial encounter [S69.91XA]  1. Injury of right wrist, initial encounter  XR wrist 3+ vw right      2. Closed fracture of right wrist, initial encounter  Ambulatory Referral to Orthopedic Surgery        Xray done in office - images reviewed by myself - buckle fracture noted of right radius noted   Splint applied by nursing staff   Referral given to ortho   Pt in agreement with plan       Patient Instructions     Follow up with PCP in 3-5 days.  Proceed to  ER if symptoms worsen.    Chief Complaint     Chief Complaint   Patient presents with    Wrist Pain     Patient fell yesterday and has right wrist pain         History of Present Illness   Anuradha Lubin presents to the clinic c/o    Pt states was at a graduation party yesterday when she got caught on the picnic table and fell.  She wore a wrist splint all night since injury happened.  Does note swelling and pain.        Review of Systems   Review of Systems   All other systems reviewed and are negative.        Current Medications     Long-Term Medications   Medication Sig Dispense Refill    Ascorbic Acid (VITAMIN C) 100 MG tablet Take 100 mg by mouth daily      Biotin 1 MG CAPS Take by mouth      calcium citrate-vitamin D (CITRACAL+D) 315-200 MG-UNIT per tablet Take by mouth      cholecalciferol (VITAMIN D3) 1,000 units tablet Take 1,000 Units by mouth daily      Evening Primrose Oil 1000 MG CAPS by Does not apply route      Multiple Vitamin (DAILY VALUE MULTIVITAMIN) TABS Take by mouth      Omega-3 Fatty Acids (FISH OIL) 645 MG CAPS Take by mouth      Calcium Carbonate-Vitamin D (Oyster Shell Calcium/D) 250-125 MG-UNIT TABS Take 1 tablet by mouth daily (Patient not taking: Reported on 2021)      L-Lysine 500 MG TABS Take 500 mg by mouth daily (Patient not taking:  Reported on 9/15/2023)         Current Allergies     Allergies as of 06/10/2024    (No Known Allergies)            The following portions of the patient's history were reviewed and updated as appropriate: allergies, current medications, past family history, past medical history, past social history, past surgical history and problem list.    Objective   /76   Pulse 76   Temp (!) 96.7 °F (35.9 °C) (Tympanic)   Resp 18   Wt 73.5 kg (162 lb)   SpO2 98%   BMI 28.47 kg/m²        Physical Exam     Physical Exam  Vitals and nursing note reviewed.   Constitutional:       Appearance: Normal appearance. She is well-developed.   HENT:      Head: Normocephalic and atraumatic.      Nose: Nose normal.      Mouth/Throat:      Lips: Pink.      Mouth: Mucous membranes are moist.   Eyes:      General: Lids are normal.      Conjunctiva/sclera: Conjunctivae normal.      Pupils: Pupils are equal, round, and reactive to light.   Cardiovascular:      Rate and Rhythm: Normal rate and regular rhythm.      Heart sounds: Normal heart sounds, S1 normal and S2 normal.   Pulmonary:      Effort: Pulmonary effort is normal.      Breath sounds: Normal breath sounds.   Abdominal:      General: Abdomen is flat. Bowel sounds are normal.      Palpations: Abdomen is soft.   Musculoskeletal:      Right wrist: Swelling, tenderness and bony tenderness present. No deformity, effusion, lacerations, snuff box tenderness or crepitus. Decreased range of motion. Normal pulse.      Cervical back: Full passive range of motion without pain, normal range of motion and neck supple.   Skin:     General: Skin is warm and dry.   Neurological:      General: No focal deficit present.      Mental Status: She is alert and oriented to person, place, and time.   Psychiatric:         Mood and Affect: Mood normal.         Speech: Speech normal.         Behavior: Behavior normal. Behavior is cooperative.         Thought Content: Thought content normal.          "Judgment: Judgment normal.         Orthopedic injury treatment    Date/Time: 6/10/2024 11:45 AM    Performed by: TOMEKA Hart  Authorized by: TOMEKA Hart    Patient Location:  [facility]  Phil Campbell Protocol:  Consent: Verbal consent obtained.  Risks and benefits: risks, benefits and alternatives were discussed  Consent given by: patient  Time out: Immediately prior to procedure a \"time out\" was called to verify the correct patient, procedure, equipment, support staff and site/side marked as required.  Timeout called at: 6/10/2024 1:09 PM.  Patient understanding: patient states understanding of the procedure being performed  Patient consent: the patient's understanding of the procedure matches consent given  Procedure consent: procedure consent matches procedure scheduled  Relevant documents: relevant documents present and verified  Test results: test results available and properly labeled  Site marked: the operative site was marked  Radiology Images displayed and confirmed. If images not available, report reviewed: imaging studies available  Required items: required blood products, implants, devices, and special equipment available  Patient identity confirmed: verbally with patient    Injury location:  Wrist  Location details:  Right wrist  Injury type:  Fracture  Fracture type: distal radius    Fracture type: distal radius    Neurovascular status: Neurovascularly intact    Distal perfusion: normal    Neurological function: normal    Range of motion: reduced    Manipulation performed?: No    Immobilization:  Splint  Splint type:  Volar short arm  Supplies used:  Cotton padding, elastic bandage and Ortho-Glass  Neurovascular status: Neurovascularly intact    Distal perfusion: normal    Neurological function: normal    Range of motion: unchanged    Patient tolerance:  Patient tolerated the procedure well with no immediate complications                "

## 2024-06-10 NOTE — PATIENT INSTRUCTIONS
Wrist Fracture in Adults   AMBULATORY CARE:   A wrist fracture  is a break in one or more of the bones in your wrist.        Signs and symptoms:   Pain, swelling, and bruising of your injured wrist    Wrist pain that is worse when you hold something or put pressure on your wrist    Weakness, numbness, or tingling in your injured hand or wrist    Trouble moving your wrist, hand, or fingers    A change in the shape of your wrist    Seek care immediately if:   Your pain gets worse or does not get better after you take pain medicine.    Your cast or splint breaks, gets wet, or is damaged.    Your hand or fingers feel numb or cold.    Your hand or fingers turn white or blue.    Your splint or cast feels too tight.    You have more pain or swelling after the cast or splint is put on.    Call your doctor if:   You have a fever.    There is a foul smell or blood coming from under the cast.    You have questions or concerns about your condition or care.    Treatment for a wrist fracture  will depend on which wrist bone was broken and the kind of fracture you have. You may need any of the following:  Medicine  may be given to decrease pain and swelling. You may need antibiotic medicine or a tetanus shot if there is a break in your skin.    A cast, splint, or brace  may be placed on your wrist to decrease movement. These devices will help hold the bones in place while they heal.    Traction  may be needed if your bone broke into 2 pieces. Traction pulls on the bone pieces to pull them back into place. A pin may be put in your bone or cast and hooked to ropes and a pulley. Weight is hung on the rope to help pull on the bones so they will heal correctly.    A closed reduction  is a procedure to put your bones into the correct position without surgery.    Surgery  may be needed to put your bones back into the correct position. Wires, pins, plates or screws may be used to help hold the bones in place.    Self-care:   Rest  as much  as possible. Do not play contact sports until the healthcare provider says it is okay.    Apply ice  on your wrist for 15 to 20 minutes every hour or as directed. Use an ice pack, or put crushed ice in a plastic bag. Cover it with a towel before you place it on your skin. Ice helps prevent tissue damage and decreases swelling and pain.    Elevate  your wrist above the level of your heart as often as possible. This will help decrease swelling and pain. Prop your wrist on pillows or blankets to keep it elevated comfortably.       Cast or splint care:   You may take a bath or shower as directed. Do not let your cast or splint get wet. Before bathing, cover the cast or splint with 2 plastic trash bags. Tape the bags to your skin above the cast or splint to seal out the water. Keep your arm out of the water in case the bag breaks. If a plaster cast gets wet and soft, call your healthcare provider.    Check the skin around the cast or splint every day. You may put lotion on any red or sore areas.    Do not push down or lean on the cast or brace because it may break.    Do not  scratch the skin under the cast by putting a sharp or pointed object inside the cast.    Go to physical therapy as directed:  You may need physical therapy after your wrist heals and the cast is removed. A physical therapist can teach you exercises to help improve movement and strength and to decrease pain.  Follow up with your doctor or bone specialist as directed:  You may need to return to have your cast removed. You may also need an x-ray to check how well the bone has healed. Write down your questions so you remember to ask them during your visits.  © Copyright [employer] 2023 Information is for End User's use only and may not be sold, redistributed or otherwise used for commercial purposes.  The above information is an  only. It is not intended as medical advice for individual conditions or treatments. Talk to your doctor, nurse  or pharmacist before following any medical regimen to see if it is safe and effective for you.